# Patient Record
Sex: MALE | Race: BLACK OR AFRICAN AMERICAN
[De-identification: names, ages, dates, MRNs, and addresses within clinical notes are randomized per-mention and may not be internally consistent; named-entity substitution may affect disease eponyms.]

---

## 2020-08-13 ENCOUNTER — HOSPITAL ENCOUNTER (EMERGENCY)
Dept: HOSPITAL 54 - ER | Age: 45
Discharge: PSYCHIATRIC OR SUBSTANCE USE FACILITY | End: 2020-08-13
Payer: COMMERCIAL

## 2020-08-13 VITALS — BODY MASS INDEX: 26.6 KG/M2 | WEIGHT: 190 LBS | HEIGHT: 71 IN

## 2020-08-13 VITALS — DIASTOLIC BLOOD PRESSURE: 75 MMHG | SYSTOLIC BLOOD PRESSURE: 128 MMHG

## 2020-08-13 DIAGNOSIS — R45.851: Primary | ICD-10-CM

## 2020-08-13 DIAGNOSIS — F31.9: ICD-10-CM

## 2020-08-13 DIAGNOSIS — F20.9: ICD-10-CM

## 2020-08-13 LAB
ALBUMIN SERPL BCP-MCNC: 4.2 G/DL (ref 3.4–5)
ALP SERPL-CCNC: 64 U/L (ref 46–116)
ALT SERPL W P-5'-P-CCNC: 32 U/L (ref 12–78)
APAP SERPL-MCNC: 0 UG/ML (ref 10–30)
APPEARANCE UR: CLEAR
AST SERPL W P-5'-P-CCNC: 24 U/L (ref 15–37)
BASOPHILS # BLD AUTO: 0.1 /CMM (ref 0–0.2)
BASOPHILS NFR BLD AUTO: 0.9 % (ref 0–2)
BILIRUB DIRECT SERPL-MCNC: 0.2 MG/DL (ref 0–0.2)
BILIRUB SERPL-MCNC: 0.9 MG/DL (ref 0.2–1)
BILIRUB UR QL STRIP: NEGATIVE
BUN SERPL-MCNC: 24 MG/DL (ref 7–18)
CALCIUM SERPL-MCNC: 9.7 MG/DL (ref 8.5–10.1)
CHLORIDE SERPL-SCNC: 104 MMOL/L (ref 98–107)
CO2 SERPL-SCNC: 31 MMOL/L (ref 21–32)
COLOR UR: YELLOW
CREAT SERPL-MCNC: 1.3 MG/DL (ref 0.6–1.3)
EOSINOPHIL NFR BLD AUTO: 1.8 % (ref 0–6)
ETHANOL SERPL-MCNC: < 3 MG/DL (ref 0–0)
GLUCOSE SERPL-MCNC: 86 MG/DL (ref 74–106)
GLUCOSE UR STRIP-MCNC: NEGATIVE MG/DL
HCT VFR BLD AUTO: 46 % (ref 39–51)
HGB BLD-MCNC: 14.4 G/DL (ref 13.5–17.5)
HGB UR QL STRIP: NEGATIVE ERY/UL
KETONES UR STRIP-MCNC: NEGATIVE MG/DL
LEUKOCYTE ESTERASE UR QL STRIP: NEGATIVE
LYMPHOCYTES NFR BLD AUTO: 2.2 /CMM (ref 0.8–4.8)
LYMPHOCYTES NFR BLD AUTO: 22.5 % (ref 20–44)
MCHC RBC AUTO-ENTMCNC: 32 G/DL (ref 31–36)
MCV RBC AUTO: 95 FL (ref 80–96)
MONOCYTES NFR BLD AUTO: 0.7 /CMM (ref 0.1–1.3)
MONOCYTES NFR BLD AUTO: 7.2 % (ref 2–12)
NEUTROPHILS # BLD AUTO: 6.6 /CMM (ref 1.8–8.9)
NEUTROPHILS NFR BLD AUTO: 67.6 % (ref 43–81)
NITRITE UR QL STRIP: NEGATIVE
PH UR STRIP: 6.5 [PH] (ref 5–8)
PLATELET # BLD AUTO: 263 /CMM (ref 150–450)
POTASSIUM SERPL-SCNC: 4.5 MMOL/L (ref 3.5–5.1)
PROT SERPL-MCNC: 7.8 G/DL (ref 6.4–8.2)
PROT UR QL STRIP: NEGATIVE MG/DL
RBC # BLD AUTO: 4.79 MIL/UL (ref 4.5–6)
RBC #/AREA URNS HPF: (no result) /HPF (ref 0–2)
SALICYLATES SERPL-MCNC: 1.3 MG/DL (ref 2.8–20)
SODIUM SERPL-SCNC: 139 MMOL/L (ref 136–145)
UROBILINOGEN UR STRIP-MCNC: 1 EU/DL
WBC #/AREA URNS HPF: (no result) /HPF (ref 0–3)
WBC NRBC COR # BLD AUTO: 9.8 K/UL (ref 4.3–11)

## 2020-08-13 PROCEDURE — G0480 DRUG TEST DEF 1-7 CLASSES: HCPCS

## 2020-08-13 PROCEDURE — 87086 URINE CULTURE/COLONY COUNT: CPT

## 2020-08-13 PROCEDURE — 80048 BASIC METABOLIC PNL TOTAL CA: CPT

## 2020-08-13 PROCEDURE — 99285 EMERGENCY DEPT VISIT HI MDM: CPT

## 2020-08-13 PROCEDURE — 85025 COMPLETE CBC W/AUTO DIFF WBC: CPT

## 2020-08-13 PROCEDURE — 80307 DRUG TEST PRSMV CHEM ANLYZR: CPT

## 2020-08-13 PROCEDURE — 80329 ANALGESICS NON-OPIOID 1 OR 2: CPT

## 2020-08-13 PROCEDURE — 36415 COLL VENOUS BLD VENIPUNCTURE: CPT

## 2020-08-13 PROCEDURE — 81001 URINALYSIS AUTO W/SCOPE: CPT

## 2020-08-13 PROCEDURE — 80305 DRUG TEST PRSMV DIR OPT OBS: CPT

## 2020-08-13 PROCEDURE — 80076 HEPATIC FUNCTION PANEL: CPT

## 2020-08-13 NOTE — NUR
This SW met with the patient at bedside. Patient is a 45 year old  male, 
alert, awake, and expressed awareness of being in the Emergency room and needing psychiatric 
care. Patient was receptive to meeting with social work. SW conducted this meeting in 
English. Patient reported that he was brought into the ED due to having a suicidal plan to 
run in front of traffic. This SW confirmed patient's demographics on the facesheet. Patient 
reports living with mother who receives SSI approximately $900. Patient reports that he has 
been previously diagnosed with depression and is receiving medication through his primary 
care physician. Patient reports no history or drug/alcohol use. SW discussed inpatient 
psychiatric hospitalization with the patient.  Patient expressed agreement. Patient thanked 
SW for her assistance.  Patient was responsive with SW as he was laying in the bed while 
focusing on the floor. SW had to redirect patient to speak louder on occasion and to repeat 
himself for SW as his tone of voice was low. Patient was cooperative. 



SW to finding placement for the patient.

## 2020-08-13 NOTE — NUR
contacted Arnoldo [phone] at [facility] to verify eligibly 
for this patient to be transferred to one of their facilities.  This SW faxed [phone] 
over the clinicals including facesheet, doctor's note stating he is medically cleared, and 
toxicology reports.

## 2020-08-13 NOTE — NUR
This SW contacted Arnoldo from [facility] [phone] asked for an update on this patient as SW 
did not hear back for confirmation details. 

Lavon from [facility] [phone] followed-up with MERT that the patient was accepted. Lavon 
contacted the ED directly and spoke to DENISE Fletcher directly.

## 2020-08-13 NOTE — NUR
ELLEN FROM [facility] CALLED PATIENT ACCEPTED AT [facility] 

ACCEPTED BY DR SAUCEDO AND DR ROLDAN 

[phone] UNIT 2

## 2020-08-13 NOTE — NUR
was contacted by Arnoldo from [facility] [phone] stating that the patient had 
changed his [facility] from [facility] to [facility]. Arnoldo stated that [facility] will be 
able to accept the patient. This SW is awaiting confirmation details.